# Patient Record
(demographics unavailable — no encounter records)

---

## 2024-12-27 NOTE — HISTORY OF PRESENT ILLNESS
[FreeTextEntry1] : 56 y/o F with PMH of MVP and facial BCC x5  and excision of left proximal forearm atypical dysplastic nevus who present today to discuss excision of left medial thigh melanocytic nevus with moderate atypia. Patient had an office shave biopsy of several skin lesions, this one revealed moderate atypia. Referred by Dr. Moss for excision.   Also c/o bilateral hand pain and finger numbness L>R for several years. EMG revealed BL CTS, moderate on the left and mild on the right.   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 Offers no complaints.  Interval hx (12/27/24):  Here for left forearm SCC in situ excision and reconstruction. She was diagnosed with SCC in situ 1 month ago.

## 2024-12-27 NOTE — DATA REVIEWED
[FreeTextEntry1] : 10/15/2020 Pathology - left medial thigh - junctional melanocytic nevus with moderate atypia   10/25/24: left distal forearm SCC in situ.

## 2024-12-27 NOTE — HISTORY OF PRESENT ILLNESS
[FreeTextEntry1] : 54 y/o F with PMH of MVP and facial BCC x5  and excision of left proximal forearm atypical dysplastic nevus who present today to discuss excision of left medial thigh melanocytic nevus with moderate atypia. Patient had an office shave biopsy of several skin lesions, this one revealed moderate atypia. Referred by Dr. Moss for excision.   Also c/o bilateral hand pain and finger numbness L>R for several years. EMG revealed BL CTS, moderate on the left and mild on the right.   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 Offers no complaints.  Interval hx (12/27/24):  Here for left forearm SCC in situ excision and reconstruction. She was diagnosed with SCC in situ 1 month ago.

## 2024-12-27 NOTE — PROCEDURE
[Nl] : None [FreeTextEntry1] : left forearm SCC in situ [FreeTextEntry2] : Left forearm SCC in situ excision and local tissue rearranagement [FreeTextEntry6] : Benefits, risks and alternatives of the procedure were discussed. The risks include but not limited to bleeding, infection, poor wound healing and scarring, possible keloid, and need for re-operation. Location of scar and expected post-surgical outcomes were discussed. The patient understands the risks and would like to proceed with the office surgery.  The skin lesion was marked and infiltrated with local anesthesia to effect.  The excision site was prepared and draped in sterile fashion. The skin lesion was excised with the indicated margins in the usual fashion and sent to pathology for review. Subcuteanous flaps were elevated and advanced for tension-free closure. Surgical wounds were made hemostatic and repaired as follows:  Site: left forearm Skin lesion width (with margins): 13 mm (5mm margins) Closure complexity and length: double opposing isaac-yang flaps spanning 3 x 4 cm. [FreeTextEntry7] : left forearm skin, 12:00 suture

## 2024-12-27 NOTE — PHYSICAL EXAM
[de-identified] : Left mid anterior thigh incision healing well, sutures C/D/I, incisional tenderness as expected, no significant erythema/swelling\par   [de-identified] : well-appearing, NAD [de-identified] : NC/AT [de-identified] : PERRL [de-identified] : supple [de-identified] : good inspiratory effort  [de-identified] : FAISALR [de-identified] : soft, nontender  [de-identified] : Left forearm with well-healed shave biopsy site 3 mm with hyperkeratotic flaky skin

## 2024-12-27 NOTE — PHYSICAL EXAM
[de-identified] : Left mid anterior thigh incision healing well, sutures C/D/I, incisional tenderness as expected, no significant erythema/swelling\par   [de-identified] : well-appearing, NAD [de-identified] : NC/AT [de-identified] : PERRL [de-identified] : supple [de-identified] : good inspiratory effort  [de-identified] : FAISALR [de-identified] : soft, nontender  [de-identified] : Left forearm with well-healed shave biopsy site 3 mm with hyperkeratotic flaky skin

## 2024-12-27 NOTE — ASSESSMENT
[FreeTextEntry1] : 60 yo F with junctional melanocytic nevus of left thigh and bilateral CTS L>R.  s/p L CTR and left tyhigh atypical nevus excision  Now with SCC in situ left distal forearm.  Recommend excision left forearm SCC in situ and local tissue rearrangement   -I reviewed the treatment of surgical excision, and I discussed the benefits, risks, and outcomes of each treatment option. -Regarding surgical excision of mass, the risks include but are not limited to bleeding, infection, hematoma, numbness, wound separation, poor wound healing, recurrence of mass, contour defect, scarring, hypertrophic scar/keloid formation, need for unplanned surgery, ongoing skin cancer surveillance, possible need for additional surgery for cancer management, and dissatisfaction with outcome. -Anticipated surgical incision location and appearance reviewed. -The patient understands the risks. All questions were answered to the patient's apparent satisfaction. -Informed consent was obtained. -Patient tolerated the procedure -Tylenol PRN pain -Photos taken.

## 2025-01-06 NOTE — DATA REVIEWED
[FreeTextEntry1] : Patient:     AILYN LYMAN   Accession:                             [id number]  Collected Date/Time:                   12/27/2024 17:29 EST Received Date/Time:                    12/30/2024 08:35 EST  Surgical Pathology Report - Auth (Verified)  Specimen(s) Submitted Left distal forearm skin lesion 12:00 suture  Final Diagnosis Left distal forearm skin lesion, excisional biopsy: -Focal basal cell carcinoma ( BCC, on slide 1B). -All surgical margins of resection, free of the BCC (the carcinoma is at least 5 mm away peripheral skin margin and 3 mm away from deep margin).  -The non-carcinoma skin showing solar elastosis Verified by: Lesley Herrera M.D. (Electronic Signature) Reported on: 01/02/25 16:46 EST, [place] [address] Phone: [phone]   Fax: [phone] _________________________________________________________________

## 2025-01-06 NOTE — PHYSICAL EXAM
[de-identified] : well-appearing, NAD [de-identified] : NC/AT [de-identified] : PERRL [de-identified] : supple [de-identified] : good inspiratory effort  [de-identified] : FAISALR [de-identified] : soft, nontender  [de-identified] : Left forearm S shaped incision with sutures in place, healing well; no erythema, open wounds or drainage.

## 2025-01-06 NOTE — ASSESSMENT
[FreeTextEntry1] : 60 yo F with junctional melanocytic nevus of left thigh and bilateral CTS L>R.  s/p L CTR and left tyhigh atypical nevus excision  Now with SCC in situ left distal forearm,.  Pt here POD #10 s/p Left forearm SCC in situ excision and local tissue rearranagement under local anaesthesia in office.  Doing well.   - Pathology discussed: showed BCC, with all clear margins .  - prolene sutures removed  - bacitracin and bandaid applied, use bacitracin x 3 days then aquaphor daily.  - Ok to start scarguard in 1 week - no heavy lifting x 1 more week  - ok to shower, do not submerge  - continue close dermatologic surveillance.  - follow up x 1 month with Dr. Izaguirre for scar check.

## 2025-01-06 NOTE — PHYSICAL EXAM
[de-identified] : well-appearing, NAD [de-identified] : NC/AT [de-identified] : PERRL [de-identified] : supple [de-identified] : good inspiratory effort  [de-identified] : FAISALR [de-identified] : soft, nontender  [de-identified] : Left forearm incision c/d/i with sutures in place ***; hyperkeratotic flaky skin

## 2025-01-06 NOTE — DATA REVIEWED
[FreeTextEntry1] : Patient:     AILYN LYMAN   Accession:                             [id number]  Collected Date/Time:                   12/27/2024 17:29 EST Received Date/Time:                    12/30/2024 08:35 EST  Surgical Pathology Report - Auth (Verified)  Specimen(s) Submitted Left distal forearm skin lesion 12:00 suture  Final Diagnosis Left distal forearm skin lesion, excisional biopsy: -Focal basal cell carcinoma ( BCC, on slide 1B). -All surgical margins of resection, free of the BCC (the carcinoma is at least 5 mm away peripheral skin margin and 3 mm away from deep margin).  -The non-carcinoma skin showing solar elastosis Verified by: Lesley Herrera M.D. (Electronic Signature) Reported on: 01/02/25 16:46 EST, [place] [address] Phone: [phone]   Fax: [phone] _________________________________________________________________

## 2025-01-06 NOTE — ASSESSMENT
[FreeTextEntry1] : 60 yo F with junctional melanocytic nevus of left thigh and bilateral CTS L>R.  s/p L CTR and left tyhigh atypical nevus excision  Now with SCC in situ left distal forearm.  Pt now here POD #10 s/p left forearm SCC in situ excision with local tissue rearrangement under local anesthesia in office.  Doing well.  - prolene sutures removed.  - covered with bacitracin and telfa, cont bacitracin x 3 days then daily aquaphor  - ok to start scar guard in 1.5 weeks.   - pathology discussed: showed BCC with all clear margins - copy provided.  - no heavy lifting x 1 more week  - ok to shower, no submerging   - continue close dermatologic surveillance.  - follow up x 1 month with Dr. Izaguirre for scar check.

## 2025-01-06 NOTE — HISTORY OF PRESENT ILLNESS
[FreeTextEntry1] : 56 y/o F with PMH of MVP and facial BCC x5  and excision of left proximal forearm atypical dysplastic nevus who present today to discuss excision of left medial thigh melanocytic nevus with moderate atypia. Patient had an office shave biopsy of several skin lesions, this one revealed moderate atypia. Referred by Dr. Moss for excision.   Also c/o bilateral hand pain and finger numbness L>R for several years. EMG revealed BL CTS, moderate on the left and mild on the right.   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 Offers no complaints.  Interval hx (12/27/24):  Here for left forearm SCC in situ excision and reconstruction. She was diagnosed with SCC in situ 1 month ago.  Interval Hx (1/6/25): Pt here today POD#10 s/p Left forearm SCC in situ excision with local tissue rearrangement.  Doing well, no complaints.  Denies f/c.

## 2025-01-06 NOTE — HISTORY OF PRESENT ILLNESS
[FreeTextEntry1] : 54 y/o F with PMH of MVP and facial BCC x5  and excision of left proximal forearm atypical dysplastic nevus who present today to discuss excision of left medial thigh melanocytic nevus with moderate atypia. Patient had an office shave biopsy of several skin lesions, this one revealed moderate atypia. Referred by Dr. Moss for excision.   Also c/o bilateral hand pain and finger numbness L>R for several years. EMG revealed BL CTS, moderate on the left and mild on the right.   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 Offers no complaints.  Interval hx (12/27/24):  Here for left forearm SCC in situ excision and reconstruction. She was diagnosed with SCC in situ 1 month ago.  Interval Hx (1/6/25): Pt here today POD #10 s/p left forearm SCC excision.  Doing well, no complaints.

## 2025-02-03 NOTE — DATA REVIEWED
[FreeTextEntry1] : Patient:     AILYN LYMAN   Accession:                             [id number]  Collected Date/Time:                   12/27/2024 17:29 EST Received Date/Time:                    12/30/2024 08:35 EST  Surgical Pathology Report - Auth (Verified)  Specimen(s) Submitted Left distal forearm skin lesion 12:00 suture  Final Diagnosis Left distal forearm skin lesion, excisional biopsy: -Focal basal cell carcinoma ( BCC, on slide 1B). -All surgical margins of resection, free of the BCC (the carcinoma is at least 5 mm away peripheral skin margin and 3 mm away from deep margin).  -The non-carcinoma skin showing solar elastosis Verified by: Lesley Herrera M.D. (Electronic Signature) Reported on: 01/02/25 16:46 EST, [place] [address] Phone: [phone]   Fax: [phone] _________________________________________________________________

## 2025-02-03 NOTE — HISTORY OF PRESENT ILLNESS
[FreeTextEntry1] : 56 y/o F with PMH of MVP and facial BCC x5  and excision of left proximal forearm atypical dysplastic nevus who present today to discuss excision of left medial thigh melanocytic nevus with moderate atypia. Patient had an office shave biopsy of several skin lesions, this one revealed moderate atypia. Referred by Dr. Moss for excision.   Also c/o bilateral hand pain and finger numbness L>R for several years. EMG revealed BL CTS, moderate on the left and mild on the right.   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 Offers no complaints.  Interval hx (12/27/24):  Here for left forearm SCC in situ excision and reconstruction. She was diagnosed with SCC in situ 1 month ago.  Interval Hx (1/6/25): Pt here today POD#10 s/p Left forearm SCC in situ excision with local tissue rearrangement.  Doing well, no complaints.  Denies f/c.     Interval hx (2/3/25):  ~6 weeks s/p Left forearm SCC in situ excision with local tissue rearrangement.  Pt very happy with results.  Flattening contours.  Denies open wounds

## 2025-02-03 NOTE — PHYSICAL EXAM
[de-identified] : well-appearing, NAD [de-identified] : NC/AT [de-identified] : PERRL [de-identified] : supple [de-identified] : good inspiratory effort  [de-identified] : FAISALR [de-identified] : soft, nontender  [de-identified] : Left forearm Z-plasty incision healing well, even contour, post-surgical soft tissue changes

## 2025-02-03 NOTE — ASSESSMENT
[FreeTextEntry1] : 60 yo F with junctional melanocytic nevus of left thigh and bilateral CTS L>R.  s/p L CTR and left tyhigh atypical nevus excision  Now with SCC in situ left distal forearm.  6 weeks s/p left forearm SCC in situ excision with local tissue rearrangement under local anesthesia in office.  Doing well.   - start scar guard daily x 6 weeks - pathology discussed: showed BCC with all clear margins - copy provided.  - resume all activity - continue close dermatologic surveillance as scheduled - reviewed signs of recurrent BCC and SCC.  pt endorsed understanding and knows to call Derm or PRS - daily SPF - follow up PRN

## 2025-03-26 NOTE — REVIEW OF SYSTEMS
[Feeling Fatigued] : not feeling fatigued [SOB] : no shortness of breath [Dyspnea on exertion] : not dyspnea during exertion [Chest Discomfort] : no chest discomfort [Palpitations] : no palpitations [Cough] : no cough [Joint Pain] : no joint pain [Rash] : no rash [Dizziness] : no dizziness

## 2025-03-26 NOTE — CARDIOLOGY SUMMARY
[de-identified] : EKG 3/26/25 Normal sinus rhythm non specific T wave flattening 65 bpm EKG 12/16/22 Normal sinus rhythm 65 bpm  EKG 12/11/23 NSR 65 bpm [de-identified] : TTE 6/24/2020 normal global LV sys function, no AS, moderate MV prolapse, mild MR TTE  3/30/23. normal LV sys and diastolic function. MV prolapse, trace MR, mild LAE.

## 2025-03-26 NOTE — HISTORY OF PRESENT ILLNESS
[FreeTextEntry1] : 59F  (gDM, gHTN, preeclampsia) with MV prolapse and trace MR here for follow-up.   3/26/2025 2 surgeries last year  (bilateral salpingectomy and oophorectomy , L shoulder). No complications No chest pain, shortness of breath, palpitations, lightheadedness/dizziness, pre-syncope/syncope, or lower extremity edema.  Occasional "gurggling in abdomen" Patient snoring,  had to change rooms Younger sister recently diagnosed with AFIb s/p ablation  Exercise: peloton 2x/week without symptoms, walking Diet: salads, protein shakes, soup, chicken/veg for dinner  23 patient present for follow up. endorses f infrequent palpitation once a month. it lasts for seconds, and it goes away. no triggering factors. patient is able to exercise regularly on Peloton bike with no symptoms of CP, SOB and palpitations. she denies n/v/d, bowel and bladder symptoms.  Patient is scheduled for bilateral salpingectomy and oophorectomy for breast cancer risk reduction.  2022 No chest pain, shortness of breath, palpitations, lightheadedness/dizziness, pre-syncope/syncope, or lower extremity edema.   Diet: "generally healthy", chicken, rare beef, salads daily, minimize carbs, good water intake, no soda, no juice, 2 cups coffee per day  Exercise: goal is 10K steps per day, slowed down using [employer] and daughter took it to her house  Recent abd aorta US normal per patient   Exercise: 23 , HLD 66. 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

## 2025-03-26 NOTE — PHYSICAL EXAM
[Well Developed] : well developed [Well Nourished] : well nourished [No Acute Distress] : no acute distress [Normal Conjunctiva] : normal conjunctiva [No Carotid Bruit] : no carotid bruit [Normal S1, S2] : normal S1, S2 [No Murmur] : no murmur [No Rub] : no rub [No Gallop] : no gallop [Clear Lung Fields] : clear lung fields [Good Air Entry] : good air entry [No Respiratory Distress] : no respiratory distress  [Moves all extremities] : moves all extremities [No Focal Deficits] : no focal deficits [Normal Speech] : normal speech [No edema] : no edema [No varicosities] : no varicosities [No rashes] : no rashes

## 2025-03-26 NOTE — ASSESSMENT
[FreeTextEntry1] : Assessment: #Snoring #MV prolapse with trace MR - EKG 12/11/23 NSR -TTE 6/24/2020 normal global LV sys function, no AS, moderate MV prolapse, mild MR - TTE  3/30/23. normal LV sys and diastolic function. MV prolapse, trace MR, mild LAE. #Prevention counseling - Non-smoker, normotensive, BMI 25 #HLD - 9/7/23 , HLD 66. TG91,  - 7/15/2021 , HDL 60, TG 63,  #FMH of premature CAD  9/2024 , HDL 61, TG 88,  - 3.1% Low Current 10-Year  ASCVD Risk K 4.4 Cr 0.64 A1c 5.6% TSH 2.07 Hgb 14.5  Plan: - WATCHPAT to evaluate for LISSY - Lifestyle modification for management of hyperlipidemia - Encouraged plant-based and Mediterranean diets, along with increased fruit, nut, vegetable, legume, and lean vegetable or animal protein (preferably fish) consumption - Engage in at least 150 minutes per week of accumulated moderate-intensity aerobic physical activity or 75 minutes per week of vigorous-intensity aerobic physical activity - repeat ECHO every 3 years or sooner if symptomatic, next due 6/2026 - Labs with PCP before next visit ([facility]) - Return to clinic in 1 year or sooner PRN.

## 2025-05-02 NOTE — REASON FOR VISIT
[Home] : at home, [unfilled] , at the time of the visit. [Medical Office: ([facility])___] : at the medical office located in  [Telehealth (audio & video)] : This visit was provided via telehealth using real-time 2-way audio visual technology. [Verbal consent obtained from patient] : the patient, [unfilled] [Initial] : an initial visit [Sleep Apnea] : sleep apnea

## 2025-05-02 NOTE — ASSESSMENT
[FreeTextEntry1] : LISSY  - HST reviewed with patient; Mild LISSY w/ minimal symptoms  - Weight management discussed  - Counseled about weight maintenance or loss as weight loss may improve symptoms and weight gain may contribute to progression of LISSY   -Routine f/u w/ Cards

## 2025-05-02 NOTE — HISTORY OF PRESENT ILLNESS
Can offer august 6 240pm, or July 31 at 330, or next available   [TextBox_4] : 58 y/o F presents to establish care, and review sleep study ordered by cards   Ref by Dr. Ramos for snoring  Reports good quality sleep, Denies daytime somnolence and fatigue